# Patient Record
Sex: FEMALE | Race: WHITE
[De-identification: names, ages, dates, MRNs, and addresses within clinical notes are randomized per-mention and may not be internally consistent; named-entity substitution may affect disease eponyms.]

---

## 2017-03-16 ENCOUNTER — HOSPITAL ENCOUNTER (OUTPATIENT)
Dept: HOSPITAL 39 - YCHH | Age: 72
Discharge: HOME | End: 2017-03-16
Payer: MEDICARE

## 2017-03-16 DIAGNOSIS — E11.9: Primary | ICD-10-CM

## 2017-03-16 DIAGNOSIS — I50.9: ICD-10-CM

## 2017-03-16 DIAGNOSIS — I25.10: ICD-10-CM

## 2017-03-16 DIAGNOSIS — N18.9: ICD-10-CM

## 2017-03-16 DIAGNOSIS — I12.9: ICD-10-CM

## 2017-05-08 ENCOUNTER — HOSPITAL ENCOUNTER (OUTPATIENT)
Dept: HOSPITAL 39 - MAMMO | Age: 72
Discharge: HOME | End: 2017-05-08
Attending: NURSE PRACTITIONER
Payer: MEDICARE

## 2017-05-08 DIAGNOSIS — Z12.31: Primary | ICD-10-CM

## 2017-05-08 NOTE — MAM
EXAM DESCRIPTION: 



Screening Mammogram,Bilateral



CLINICAL HISTORY: 



72 years, Female, Screening mammogram



COMPARISON: 



May 2, 2016



TECHNIQUE: 



CC and MLO digital mammograms with computer aided detection.





FINDINGS: 



There are scattered fibroglandular densities.

There is no dominant mass nor any suspicious microcalcifications.

Benign microcalcifications are present.





IMPRESSION: 



BI-RADS 2: BENIGN



FOLLOW-UP: Routine mammography screening.



Electronically signed by:  Luis Martinez MD  5/8/2017 11:41 AM

CDT

## 2017-06-14 ENCOUNTER — HOSPITAL ENCOUNTER (OUTPATIENT)
Dept: HOSPITAL 39 - YCHH | Age: 72
Discharge: HOME | End: 2017-06-14
Payer: MEDICARE

## 2017-06-14 DIAGNOSIS — E11.9: Primary | ICD-10-CM

## 2017-09-14 ENCOUNTER — HOSPITAL ENCOUNTER (OUTPATIENT)
Dept: HOSPITAL 39 - LAB.O | Age: 72
Discharge: HOME | End: 2017-09-14
Attending: NURSE PRACTITIONER
Payer: MEDICARE

## 2017-09-14 DIAGNOSIS — I50.9: Primary | ICD-10-CM

## 2017-09-14 DIAGNOSIS — E11.9: ICD-10-CM

## 2017-09-14 DIAGNOSIS — R35.0: ICD-10-CM

## 2017-09-15 NOTE — RAD
Procedure:  XR CHEST 2 VIEWS



Exam date: 9/14/2017 9:36 AM CDT



Ordering Provider:  Katia Birmingham



Clinical Indication:  CONGESTIVE HEART FAILURE



Comparison: 5/15/2015



Findings:



Mild cardiomegaly. Stable post CABG changes.



Mild central vascular congestion. 



No pleural effusion or pneumothorax. Osseous structures are

nonacute.



No evidence of active tuberculosis.





Impression:



Cardiomegaly with central vascular congestion. No overt failure.

Otherwise, nonacute two-view chest.



Electronically signed by:  Catarino Tobias MD  9/15/2017 5:58 AM

CDT Workstation: [id number]

## 2017-09-18 ENCOUNTER — HOSPITAL ENCOUNTER (OUTPATIENT)
Dept: HOSPITAL 39 - YCFC.O | Age: 72
Discharge: HOME | End: 2017-09-18
Attending: NURSE PRACTITIONER
Payer: MEDICARE

## 2017-09-18 DIAGNOSIS — I50.9: Primary | ICD-10-CM

## 2017-09-20 ENCOUNTER — HOSPITAL ENCOUNTER (EMERGENCY)
Dept: HOSPITAL 39 - ER | Age: 72
Discharge: HOME | End: 2017-09-20
Payer: MEDICARE

## 2017-09-20 VITALS — OXYGEN SATURATION: 99 % | SYSTOLIC BLOOD PRESSURE: 151 MMHG | DIASTOLIC BLOOD PRESSURE: 47 MMHG

## 2017-09-20 VITALS — TEMPERATURE: 98.4 F

## 2017-09-20 DIAGNOSIS — Z79.4: ICD-10-CM

## 2017-09-20 DIAGNOSIS — Z95.1: ICD-10-CM

## 2017-09-20 DIAGNOSIS — I50.43: ICD-10-CM

## 2017-09-20 DIAGNOSIS — E11.9: ICD-10-CM

## 2017-09-20 DIAGNOSIS — I11.0: Primary | ICD-10-CM

## 2017-09-20 PROCEDURE — 80053 COMPREHEN METABOLIC PANEL: CPT

## 2017-09-20 PROCEDURE — 84484 ASSAY OF TROPONIN QUANT: CPT

## 2017-09-20 PROCEDURE — 85025 COMPLETE CBC W/AUTO DIFF WBC: CPT

## 2017-09-20 PROCEDURE — 93005 ELECTROCARDIOGRAM TRACING: CPT

## 2017-09-20 PROCEDURE — 71020: CPT

## 2017-09-20 PROCEDURE — 85379 FIBRIN DEGRADATION QUANT: CPT

## 2017-09-20 PROCEDURE — 83880 ASSAY OF NATRIURETIC PEPTIDE: CPT

## 2017-09-20 PROCEDURE — 84443 ASSAY THYROID STIM HORMONE: CPT

## 2017-09-20 NOTE — RAD
EXAM DESCRIPTION:



XR CHEST 2 VIEWS



CLINICAL HISTORY:



Shortness of breath



COMPARISON:



9/14/2017



TECHNIQUE:



PA/lateral



FINDINGS:



Mild cardiomegaly. Pulmonary vascular congestion. Increased

interstitial markings in the lower lobes suggesting interstitial

edema. The upper lobes are clear. Small amount of pleural fluid

blunting the costophrenic angles



No pneumothorax. No acute bony abnormality



IMPRESSION:



Cardiomegaly and interstitial edema consistent with congestive

heart failure. Minimal effusions







Electronically signed by:  Guero Hoffman MD  9/20/2017 9:21 AM

CDT Workstation: [id number]

## 2017-09-29 ENCOUNTER — HOSPITAL ENCOUNTER (OUTPATIENT)
Dept: HOSPITAL 39 - US | Age: 72
Discharge: HOME | End: 2017-09-29
Attending: INTERNAL MEDICINE
Payer: MEDICARE

## 2017-09-29 DIAGNOSIS — N18.4: Primary | ICD-10-CM

## 2017-09-29 NOTE — US
EXAM DESCRIPTION: 

Renal:-Ultrasound



CLINICAL HISTORY: 

CHRONIC KIDNEY DISEASE



COMPARISON: 

Bilateral renal arterial Doppler evaluation on the same visit.



TECHNIQUE: 

Transcutaneous scanning: Two-dimensional and Doppler modes. 



FINDINGS: 

Right kidney measures 9.9 x 5.2 x 4.8 cm; mid-renal cortical

thickness 9 mm. . Echogenicity increased compared to the liver.

No hydronephrosis No calcifications. Slightly lobulated contour

of the kidney with no perinephric fluid. Normal vascularity. 

Proximal ureter not visualized.



Left kidney measures 9.9 x 4.8 x 5.2 cm; mid-renal cortical

thickness 1.2 mm.. Increased echogenicity. No hydronephrosis. No

calcifications. Lobulated contour of the kidney with no

perinephric fluid. 1.5 x 1.3 cm cyst in the mid kidney. Normal

vascularity..  Proximal ureter not visualized.



Urinary bladder not visualized.



IMPRESSION: Bilateral kidneys showing increased echogenicity

greater than the liver with lobulated contour, consistent with

chronic disease. Bilateral cortical thinning. No hydronephrosis.

Left renal cortical cyst. No Perinephric fluid.



Electronically signed by:  Hema Dolan MD  9/29/2017 11:54 AM

CDT Workstation: [id number]

## 2017-10-02 ENCOUNTER — HOSPITAL ENCOUNTER (OUTPATIENT)
Dept: HOSPITAL 39 - LAB.O | Age: 72
Discharge: HOME | End: 2017-10-02
Attending: INTERNAL MEDICINE
Payer: MEDICARE

## 2017-10-02 DIAGNOSIS — N18.4: Primary | ICD-10-CM

## 2017-10-25 ENCOUNTER — HOSPITAL ENCOUNTER (INPATIENT)
Dept: HOSPITAL 39 - ER | Age: 72
LOS: 2 days | Discharge: HOME | DRG: 292 | End: 2017-10-27
Attending: NURSE PRACTITIONER | Admitting: NURSE PRACTITIONER
Payer: MEDICARE

## 2017-10-25 DIAGNOSIS — E83.42: ICD-10-CM

## 2017-10-25 DIAGNOSIS — E78.5: ICD-10-CM

## 2017-10-25 DIAGNOSIS — E11.22: ICD-10-CM

## 2017-10-25 DIAGNOSIS — Z87.440: ICD-10-CM

## 2017-10-25 DIAGNOSIS — Z79.899: ICD-10-CM

## 2017-10-25 DIAGNOSIS — I13.0: Primary | ICD-10-CM

## 2017-10-25 DIAGNOSIS — I25.10: ICD-10-CM

## 2017-10-25 DIAGNOSIS — Z95.1: ICD-10-CM

## 2017-10-25 DIAGNOSIS — Z79.4: ICD-10-CM

## 2017-10-25 DIAGNOSIS — N18.4: ICD-10-CM

## 2017-10-25 DIAGNOSIS — I50.9: ICD-10-CM

## 2017-10-25 RX ADMIN — INSULIN LISPRO SCH UNITS: 100 INJECTION, SOLUTION INTRAVENOUS; SUBCUTANEOUS at 16:47

## 2017-10-25 RX ADMIN — LEVALBUTEROL SCH MG: 1.25 SOLUTION RESPIRATORY (INHALATION) at 23:48

## 2017-10-25 RX ADMIN — Medication SCH ML: at 20:55

## 2017-10-25 RX ADMIN — INSULIN LISPRO SCH: 100 INJECTION, SOLUTION INTRAVENOUS; SUBCUTANEOUS at 07:54

## 2017-10-25 RX ADMIN — LISINOPRIL SCH MG: 10 TABLET ORAL at 09:03

## 2017-10-25 RX ADMIN — INSULIN LISPRO SCH: 100 INJECTION, SOLUTION INTRAVENOUS; SUBCUTANEOUS at 21:01

## 2017-10-25 RX ADMIN — METOPROLOL TARTRATE SCH MG: 50 TABLET ORAL at 09:40

## 2017-10-25 RX ADMIN — FUROSEMIDE SCH MG: 10 INJECTION, SOLUTION INTRAVENOUS at 09:02

## 2017-10-25 RX ADMIN — METOPROLOL TARTRATE SCH MG: 50 TABLET ORAL at 16:54

## 2017-10-25 RX ADMIN — INSULIN LISPRO SCH UNITS: 100 INJECTION, SOLUTION INTRAVENOUS; SUBCUTANEOUS at 13:31

## 2017-10-25 RX ADMIN — CLOPIDOGREL BISULFATE SCH MG: 75 TABLET ORAL at 09:03

## 2017-10-25 RX ADMIN — LEVALBUTEROL SCH MG: 1.25 SOLUTION RESPIRATORY (INHALATION) at 17:00

## 2017-10-25 RX ADMIN — Medication SCH: at 03:32

## 2017-10-25 RX ADMIN — LEVALBUTEROL SCH MG: 1.25 SOLUTION RESPIRATORY (INHALATION) at 08:20

## 2017-10-25 RX ADMIN — ENOXAPARIN SODIUM SCH MG: 40 INJECTION, SOLUTION INTRAVENOUS; SUBCUTANEOUS at 09:04

## 2017-10-25 RX ADMIN — PANTOPRAZOLE SODIUM SCH MG: 40 INJECTION, POWDER, FOR SOLUTION INTRAVENOUS at 13:32

## 2017-10-25 RX ADMIN — LISINOPRIL SCH MG: 10 TABLET ORAL at 20:53

## 2017-10-25 RX ADMIN — Medication SCH ML: at 09:02

## 2017-10-25 RX ADMIN — FUROSEMIDE SCH MG: 10 INJECTION, SOLUTION INTRAVENOUS at 16:54

## 2017-10-25 RX ADMIN — INSULIN DETEMIR SCH UNITS: 100 INJECTION, SOLUTION SUBCUTANEOUS at 08:59

## 2017-10-25 NOTE — RAD
Procedure:  XR CHEST 1 VIEW        



Exam Date:  10/25/2017



Ordering Provider:  Meghann Slater



Clinical Indication:  SOB/ Hx of CHF



Comparison: 9/20/2017



Findings: 

Residuals of thoracic surgery.

Cardiac silhouette: Magnified by technique

Pulmonary vasculature : Prominence of the central pulmonary

vasculature and bibasilar interstitium.

Mediastinal contour: Normal 

Aortic contour: Aortic calcification 

Focal lung consolidation: No focal lung consolidation.

Pleural effusion: No large pleural effusions.

Pneumothorax: None

Acute bony or soft tissue abnormality: None



Impression: 

1. CHF and/or volume overload.



Electronically signed by:  Adonay Araya MD  10/25/2017 1:43 AM CDT

Workstation: [id number]

## 2017-10-25 NOTE — HP
SUPERVISING PHYSICIAN:  Guero Rios M.D.



CHIEF COMPLAINT:  I couldn't breathe.



HISTORY OF PRESENT ILLNESS:  This is a 72 year-old female patient who was 
brought to the Emergency Room by her .  She has a significant history of 
congestive heart failure.  She found that she had started getting bloated 
several days ago and her weight had gone up.  She sees Dr. Jeff, cardiologist
, but she has been out of her Lasix for 5 to 6 days.  She reports that she was 
in the hospital in [place] at some point in the last few weeks for this 
same thing.  In the Emergency Room, she was given some Lasix and her labs were 
done.  She had a white count of 11.8 and hemoglobin 11.5, hematocrit 34.7.  D-
dimer was 387.  Sodium 138, potassium 4.6, chloride 110, carbon dioxide 25, BUN 
40, creatinine 2.12.  Magnesium 1.7, AST 66, ALT 73, alkaline phosphatase 106, 
BNP 1,620.  Urinalysis was basically within normal limits.  Chest x-ray per 
radiology interpretation showed congestive heart failure and/or volume 
overload.  She was admitted to the hospital early this morning.



PAST MEDICAL HISTORY: 

1.   Type 2 diabetes mellitus.

2.   Hyperlipidemia.

3.   Hypertension.

4.   Coronary artery disease.

5.   Congestive heart failure of unknown etiology.

6.   Chronic kidney disease stage IV.

7.   History of recent urinary tract infection.



PAST SURGICAL HISTORY:

1.   Hysterectomy.

2.   Coronary artery bypass graft.

3.   Appendectomy.



OUTPATIENT MEDICATIONS:  Per the EMR and awaiting verification.



ALLERGIES:  NO KNOWN DRUG ALLERGIES.



CODE STATUS:  FULL CODE.



FAMILY HISTORY:  Mother  of a myocardial infarction.  Father  of a 
myocardial infarction.



SOCIAL HISTORY:  She is retired.  She is .  She has 4 children.  She has 
never smoked tobacco.  She drinks alcohol on a social basis only.  She drinks 
caffeine daily.



REVIEW OF SYSTEMS: 

GENERAL:  Denies fever or fatigue.  Positive for weight gain of unknown amount.

HEENT:  Negative for sinus symptoms, ear pain, vision changes or sore throat.

RESPIRATORY:  Negative for coughing or wheezing.  Positive for shortness of 
breath.

CARDIAC:  Negative for chest pain, palpitations or tachycardia.

GASTROINTESTINAL:  Negative for nausea, vomiting, diarrhea or constipation.  
Positive for generalized bloating and abdominal distention.

EXTREMITIES:  Denies edema.

SKIN:  Denies lesions or rashes.

NEUROLOGIC:  Denies headache, dizziness or seizures.



PHYSICAL EXAMINATION: 



VITAL SIGNS:  She is afebrile, heart rate 61, blood pressure 90/50, respiratory 
rate 16, O2 sat is 96% on room air.



GENERAL:  This is a 72 year-old female who is lying in her hospital bed.



HEENT:  Normocephalic and atraumatic.  Pupils are equal and reactive.  
Oropharynx is clear.



NECK:  Supple without mass.  It is difficult to discern if there is any jugular 
venous distention at this time.



RESPIRATORY:  Scattered crackles throughout, somewhat diminished at the bases.



CARDIOVASCULAR:  Regular rate and rhythm.



ABDOMEN:  Soft.  It is rounded.  It is non-tender.  Bowel sounds are positive.



EXTREMITIES:  No cyanosis or clubbing.  There is a trace of pedal edema 
bilaterally.



NEUROLOGIC:  She is awake, alert and oriented times three.



SKIN:  Warm and dry.



LABORATORY:  Labs and films are as per the history of present illness.  All 
other labs and films have been reviewed via the EMR.



ASSESSMENT: 

1.   Acute exacerbation of congestive heart failure of unknown etiology.  She is

      presently on a beta blocker as well as an Ace inhibitor.  She is also 

      prescribed a diuretic.

2.   Diabetes mellitus type 2.

3.   Hypertension.

4.   Hyperlipidemia.

5.   Chronic kidney disease stage IV.

6.   Coronary artery disease.



PLAN:  The patient has been admitted to the hospital.  She has received IV 
Lasix in the Emergency Room and we have also given her IV Lasix twice a day 
here on the floor.  We are monitoring her electrolytes closely.  We will also 
give her some magnesium.  We will try to get an echo report on her tomorrow to 
find out her exact etiology of her congestive heart failure.  I started her on 
Accu-Cheks with sliding scale insulin.  She has been placed on a cardiac 
monitor.  Hopefully she can be discharged in the next 2 to 3 days.  Will 
continue to monitor her closely and followup as needed.  Dr. Rios is the 
collaborating physician available for consultation.



#[id number]
[facility]

## 2017-10-25 NOTE — ED.PDOC
History of Present Illness





- General


Chief Complaint: Respiratory Problem


Stated Complaint: shortness of breath


Time Seen by Provider: 10/25/17 01:09


Source: patient, RN notes reviewed, Vital Signs reviewed


Exam Limitations: no limitations





- History of Present Illness


Initial Comments: 





Patient come to the ER with c/o SOB that started earlier tonight. She thinks it 

is her CHF. She has been out of her Lasix for the past 4 days and has noticed a 

10# weight gain. No chest pain. Fells same as episode she had ~ 1 month ago 

when she was seen here. She reports after that she was in the hospital @ 

[facility] for 3 days.


Timing/Duration: 7-24 hours


Severity: moderate


Activities at Onset: sleep


Possible Cause: occasional episodes


Improving Factors: nothing


Worsening Factors: movement - & talking


Associated Symptoms: denies symptoms


Respiratory Risk Factors: other - Hx of CHF, not taking her Lasix


Allergies/Adverse Reactions: 


Allergies





NO KNOWN ALLERGY Allergy (Verified 17 08:38)


 








Home Medications: 


Ambulatory Orders





Furosemide Tab [Lasix Tab] 20 mg PO DAILY #30 tab 05/15/15 


Clopidogrel Bisulfate [Plavix] 75 mg PO DAILY 17 


Insulin Glargine [Lantus Solostar] 40 unit SC DAILY 17 


Lisinopril 20 mg PO BID 17 


Metoprolol Tartrate 100 mg PO BID 17 


Pravastatin Sodium 40 mg PO BEDTIME 17 











Review of Systems





- Review of Systems


Constitutional: States: no symptoms reported.  Denies: chills, fever, malaise


Respiratory: States: see HPI, short of breath


Cardiology: States: see HPI, edema.  Denies: chest pain, palpitations


Gastrointestinal/Abdominal: States: no symptoms reported


Musculoskeletal: States: no symptoms reported


Skin: States: no symptoms reported


Neurological: States: no symptoms reported


All other Systems: No Change from Baseline





Past Medical History (General)





- Patient Medical History


Hx Stroke: No


Hx Cardiac Disorders: Yes


Hx Congestive Heart Failure: Yes


Hx Hypertension: Yes


Hx Diabetes: Yes


Surgical History: appendectomy, coronary bypass surgery, Hysterectomy





- Vaccination History


Hx Tetanus, Diphtheria Vaccination: Yes


Hx Influenza Vaccination: Yes


Hx Pneumococcal Vaccination: Yes





- Social History


Hx Tobacco Use: No


Hx Alcohol Use: No


Hx Physical Abuse: No


Hx Emotional Abuse: No


Hx Suspected Abuse: No





- Female History


Patient Pregnant: No





Family Medical History





- Family History


  ** Mother


Family History: No Known


Living Status: 


Hx Cardiac Disease: Yes - multiple family members


Hx Family Diabetes: Yes - multiple family members





Physical Exam





- Physical Exam


General Appearance: Alert, Obvious distress - obviously SOB on 2-4 word 

sentences, Well Developed, Well Groomed, Well Hydrated, Well Nourished


Neck: supple, normal inspection


Respiratory: chest non-tender, decreased breath sounds, accessory muscle use, 

crackles


Cardiovascular/Chest: normal peripheral pulses, regular rate, rhythm, no gallop

, no murmur


Peripheral Pulses: dorsalis pedis,right: 2+, dorsalis pedis,left: 2+


Gastrointestinal/Abdominal: normal bowel sounds, soft, no organomegaly, no 

pulsatile mass, tenderness - mold diffuse tenderness


Extremity: non-tender, pedal edema


Neurologic: alert, normal mood/affect, oriented x 3


Skin Exam: normal color, warm/dry


Comments: 





 Vital Signs











  10/25/17 10/25/17





  01:07 01:10


 


Temperature 99.8 F H 


 


Pulse Rate [ 78 





Apical]  


 


Respiratory 28 H 28 H





Rate  


 


Blood Pressure 160/94 





[Left Arm]  


 


O2 Sat by Pulse 97 





Oximetry  














Progress





- Progress


Progress: 





10/25/17 02:07


Lasix 40mg IV given


10/25/17 02:10


Discussed with Dr. Alvarez. Will admit to hospital.





- Results/Orders


Results/Orders: 





 Laboratory Tests











  10/25/17 10/25/17 10/25/17





  01:30 01:30 01:30


 


WBC   11.8 H 


 


RBC   3.87 L 


 


Hgb   11.5 L 


 


Hct   34.7 L 


 


MCV   89.5 


 


MCH   29.7 


 


MCHC   33.3 


 


RDW   14.5 


 


Plt Count   175 


 


MPV   9.8 


 


Absolute Neuts (auto)   9.00 H 


 


Absolute Lymphs (auto)   1.70 


 


Absolute Monos (auto)   0.80 


 


Absolute Eos (auto)   0.20 


 


Absolute Basos (auto)   0.10 


 


Neutrophils %   76.1 


 


Lymphocytes %   14.2 L 


 


Monocytes %   7.0 


 


Eosinophils %   2.0 


 


Basophils %   0.7 


 


D-Dimer, Quantitative    387 H*


 


Sodium  138  


 


Potassium  4.6  


 


Chloride  110  


 


Carbon Dioxide  25  


 


Anion Gap  7.6 L  


 


BUN  40 H  


 


Creatinine  2.12 H  


 


BUN/Creatinine Ratio  18.9  


 


Random Glucose  73  


 


Serum Osmolality  284.0  


 


Calcium  9.0  


 


Total Bilirubin  0.6  


 


AST  66 H  


 


ALT  73 H  


 


Alkaline Phosphatase  106  


 


Creatine Kinase  41  


 


CK-MB (CK-2)  0.9  


 


CK-MB (CK-2) %  Not Reportable  


 


Troponin I  0.02  


 


B-Natriuretic Peptide  1620.0 H*  


 


Serum Total Protein  7.0  


 


Albumin  3.7  


 


Globulin  3.3  


 


Albumin/Globulin Ratio  1.1  














- EKG/XRAY/CT


EKG: Sinus, nonspecific ST T wave Chg, Unchanged from - 17


XRAY: chest - CHF &/or volume overload per Radiologist





Departure





- Departure


Clinical Impression: 


Congestive heart failure


Qualifiers:


 Congestive heart failure type: combined Congestive heart failure chronicity: 

acute on chronic Qualified Code(s): I50.43 - Acute on chronic combined systolic 

(congestive) and diastolic (congestive) heart failure





Time of Disposition: 02:11


Disposition: Admit Patient


Condition: Poor


Departure Forms:  ED Discharge - Pt. Copy, Patient Portal Self Enrollment


Referrals: 


Katia Birmingham NP [Primary Care Provider] - 1-2 Weeks


Home Medications: 


Ambulatory Orders





Furosemide Tab [Lasix Tab] 20 mg PO DAILY #30 tab 05/15/15 


Clopidogrel Bisulfate [Plavix] 75 mg PO DAILY 17 


Insulin Glargine [Lantus Solostar] 40 unit SC DAILY 17 


Lisinopril 20 mg PO BID 17 


Metoprolol Tartrate 100 mg PO BID 17 


Pravastatin Sodium 40 mg PO BEDTIME 17 











Decision To Admit





- Decistion To Admit


Decision to Admit Reason: Admit from ER


Decision to Admit Date: 10/25/17


Decision to Admit Time: 02:09

## 2017-10-25 NOTE — PCM.CORE
Physician DVT/VTE





- 5 or more Very High Risk


Treatments: Sequential Compression Device


Pharmacological: Enoxaparin 40mg SQ Daily

## 2017-10-26 RX ADMIN — FUROSEMIDE SCH MG: 10 INJECTION, SOLUTION INTRAVENOUS at 17:12

## 2017-10-26 RX ADMIN — LEVALBUTEROL SCH MG: 1.25 SOLUTION RESPIRATORY (INHALATION) at 16:45

## 2017-10-26 RX ADMIN — FUROSEMIDE SCH MG: 10 INJECTION, SOLUTION INTRAVENOUS at 08:31

## 2017-10-26 RX ADMIN — LEVALBUTEROL SCH MG: 1.25 SOLUTION RESPIRATORY (INHALATION) at 09:08

## 2017-10-26 RX ADMIN — INSULIN LISPRO SCH UNITS: 100 INJECTION, SOLUTION INTRAVENOUS; SUBCUTANEOUS at 17:11

## 2017-10-26 RX ADMIN — PANTOPRAZOLE SODIUM SCH MG: 40 INJECTION, POWDER, FOR SOLUTION INTRAVENOUS at 06:04

## 2017-10-26 RX ADMIN — METOPROLOL TARTRATE SCH MG: 50 TABLET ORAL at 08:30

## 2017-10-26 RX ADMIN — LEVALBUTEROL SCH MG: 1.25 SOLUTION RESPIRATORY (INHALATION) at 23:45

## 2017-10-26 RX ADMIN — INSULIN LISPRO SCH: 100 INJECTION, SOLUTION INTRAVENOUS; SUBCUTANEOUS at 08:29

## 2017-10-26 RX ADMIN — INSULIN LISPRO SCH UNITS: 100 INJECTION, SOLUTION INTRAVENOUS; SUBCUTANEOUS at 12:12

## 2017-10-26 RX ADMIN — Medication SCH ML: at 20:04

## 2017-10-26 RX ADMIN — Medication SCH ML: at 08:40

## 2017-10-26 RX ADMIN — INSULIN LISPRO SCH UNITS: 100 INJECTION, SOLUTION INTRAVENOUS; SUBCUTANEOUS at 21:14

## 2017-10-26 RX ADMIN — LISINOPRIL SCH MG: 10 TABLET ORAL at 20:05

## 2017-10-26 RX ADMIN — INSULIN DETEMIR SCH UNITS: 100 INJECTION, SOLUTION SUBCUTANEOUS at 08:32

## 2017-10-26 RX ADMIN — ENOXAPARIN SODIUM SCH MG: 40 INJECTION, SOLUTION INTRAVENOUS; SUBCUTANEOUS at 08:31

## 2017-10-26 RX ADMIN — CLOPIDOGREL BISULFATE SCH MG: 75 TABLET ORAL at 08:30

## 2017-10-26 RX ADMIN — LISINOPRIL SCH MG: 10 TABLET ORAL at 08:30

## 2017-10-26 RX ADMIN — METOPROLOL TARTRATE SCH MG: 50 TABLET ORAL at 17:12

## 2017-10-26 NOTE — PN
DATE:  10/26/17



SUPERVISING PHYSICIAN:  Guero Rios M.D.



SUBJECTIVE:  The patient is sitting up in her hospital bed.  She reports that 
she is feeling much better.  She has much less shortness of breath, although 
she does get short of breath with some exertion.  Otherwise no complaints of 
chest pain, nausea, vomiting or diarrhea.



OBJECTIVE:  VITAL SIGNS: Temperature 98.1, pulse rate 68, blood pressure 128/81
, respiratory rate 18, O2 sat is 94% on room air.  RESPIRATORY: Essentially 
clear to auscultation bilaterally.  She is somewhat diminished at the bases.  
CARDIAC: Regular rate and rhythm.  ABDOMEN: Soft, rounded, nondistended.  Bowel 
sounds are positive.  EXTREMITIES: No cyanosis, clubbing or edema.  NEUROLOGIC: 
She is awake, alert and oriented times three.



LABORATORY:  WBCs have improved to 7.1 with hemoglobin 11.9, hematocrit 35.7.  
Sodium 136, potassium 4.3, chloride 102, carbon dioxide 25, BUN 48, creatinine 
2.37.  Blood sugars have run between 86 and 285.  Hemoglobin A1c is 7.8.  
Cardiac enzymes are negative.  Liver enzymes are also within normal limits.



RADIOLOGY:  Chest x-ray shows no acute cardiopulmonary processes.  All other 
labs and films have been reviewed via the EMR.



ASSESSMENT: 

1.   Acute exacerbation of congestive heart failure of unknown etiology.  She 
is 

      presently on a beta blocker as well as an Ace inhibitor.  She is also on 
a 

      prescribed diuretic.

2.   Diabetes mellitus type 2.

3.   Hypertension.

4.   Hyperlipidemia.

5.   Chronic kidney disease stage IV.

6.   Coronary artery disease.

7.   Mild hypomagnesemia.



PLAN:  We will continue present supportive care.  I have switched her IV Lasix 
to p.o. Lasix for tomorrow.  I will also recheck her electrolytes as well as 
her magnesium in the morning.  Her creatinine is slightly worsened today.  Her 
baseline creatinine is approximately 2.1 to 2.2, so she is not far from baseline
, but it is slightly worsened since yesterday.  If that does not improve 
overnight, we may need to call her nephrologist, Dr. Gay in [place] for 
any recommendations.  I will also order physical therapy tomorrow as well as an 
ambulation study.  Hopefully she can be discharged home with close followup.  I 
have given her extensive congestive heart failure education to reinforce her 
need to not run out of her medications, to take them as prescribed.  She will 
need close followup with Katia Birmingham.  We will continue to monitor her closely 
and followup as needed.  Dr. Rios is the collaborating physician and 
available for consultation.



#[id number]
[facility]

## 2017-10-26 NOTE — RAD
Clinical History : CHF , MAIN



Exam : PA and lateral views of the chest 10/26/2017 7:00 AM CDT



Comparisons : 

Portable AP view of the chest October 25, 2017



Findings : 







The lungs are clear without focal consolidation or pleural

effusion.



The heart is normal in size.  The mediastinal contours are normal

in appearance.  

The patient is status post sternotomy and CABG. 

There are vascular calcifications along the aortic arch.



The thoracic spine is age appropriate.  The shoulders are

unremarkable.



Limited evaluation of the upper abdomen demonstrates no gross

abnormalities.



Impression:



No acute cardiopulmonary disease

 



Electronically signed by:  Toni Zambrano MD  10/26/2017 6:56 AM

CDT Workstation: [id number]

## 2017-10-27 VITALS — TEMPERATURE: 98.6 F | DIASTOLIC BLOOD PRESSURE: 66 MMHG | OXYGEN SATURATION: 100 % | SYSTOLIC BLOOD PRESSURE: 107 MMHG

## 2017-10-27 RX ADMIN — CLOPIDOGREL BISULFATE SCH MG: 75 TABLET ORAL at 08:18

## 2017-10-27 RX ADMIN — Medication SCH ML: at 08:18

## 2017-10-27 RX ADMIN — LISINOPRIL SCH MG: 10 TABLET ORAL at 08:19

## 2017-10-27 RX ADMIN — INSULIN DETEMIR SCH UNITS: 100 INJECTION, SOLUTION SUBCUTANEOUS at 08:19

## 2017-10-27 RX ADMIN — LEVALBUTEROL SCH MG: 1.25 SOLUTION RESPIRATORY (INHALATION) at 08:01

## 2017-10-27 RX ADMIN — METOPROLOL TARTRATE SCH MG: 50 TABLET ORAL at 07:38

## 2017-10-27 RX ADMIN — INSULIN LISPRO SCH UNITS: 100 INJECTION, SOLUTION INTRAVENOUS; SUBCUTANEOUS at 07:36

## 2017-10-27 NOTE — RAD
Procedure:  XR CHEST 2 VIEWS        



Exam Date:  10/27/2017



Ordering Provider:  LEX MENDOZA



Clinical Indication:  chf



Comparison: 10/26/2017



Findings: 

Residuals of thoracic surgery.

Cardiomediastinal silhouette is stable. 

Focal lung consolidation: No focal lung consolidation. No

evidence of pulmonary edema.

Pleural effusion: None

Pneumothorax: None

Acute bony or soft tissue abnormality: None



Impression: 

1. No acute abnormalities in the chest.



Electronically signed by:  Adonay Araya MD  10/27/2017 6:29 AM CDT

Workstation: [id number]

## 2017-11-03 NOTE — DS
SUPERVISING PHYSICIAN:  Guero Rios MD



DISCHARGE DIAGNOSIS: 

1.   Acute exacerbation of congestive heart failure of unknown etiology with no

      echocardiogram available for review with the patient having been on a

      beta blocker as well as an ACE inhibitor and started on a prescribed 
diuretic,

      secondary to failure to comply with medical treatment plan.  

2.   Diabetes mellitus, type 2.

3.   Hypertension.

4.   Hyperlipidemia.

5.   Chronic kidney disease, stage IV.

6.   Coronary artery disease.

7.   Mild hypomagnesemia, resolved and normalized prior to discharge with

      transfusion of magnesium.  



HISTORY OF PRESENT ILLNESS:  Ms. Jaramillo is a 72-year-old  female 
patient who was brought to the Emergency Room by her .  She has a 
significant history of congestive heart failure.  She had started getting 
bloated several days prior and had a weight gain.  She sees Dr. Jeff, 
cardiologist, but she had been out of her Lasix for 5 to 6 days.  She reports 
that she was in the hospital in [place] at some point in the last few 
weeks for this same thing.  In the Emergency Room, she was given some Lasix and 
her labs were done.  She had a white count of 11.8 and hemoglobin 11.5, 
hematocrit 34.7.  D-dimer was 387.  Magnesium 1.7, BNP 1,620.  Chest x-ray per 
radiologic interpretation showed congestive heart failure with volume overload.
  She was admitted to the hospital for admission of treatment.



LABORATORY:  CBC on admission showed white count 11,800.  Prior to discharge, 
it was down to 7,000.  Hemoglobin and hematocrit were stable and at discharge 
were 11.8 and 35.3 with platelet count 160,000.  Differential did initially 
show a left shift, but this resolved prior to discharge.  Coagulation studies 
showed an elevated D-dimer at 387.  Chemistries on admission showed normal 
electrolytes with BUN 40, creatinine 2.12.  After aggressive diuresis with Lasix
, her BUN did go up to 65, creatinine 2.76 with serum osmolality that went from 
284 to 301.  Electrolytes remained within normal limits.  Blood sugars were 
fairly well controlled between 86 and 229.  Liver functions were all within 
normal limits.  Cardiac enzymes were within normal limits except for an 
elevated BNP of 1620.  She did initial magnesium of 1.7.  After replacement, 
magnesium went up to 2.0.  



MICROBIOLOGY:  No specimens were submitted.



RADIOLOGY:  Initial x-ray in the Emergency Department prior to admission showed 
congestive heart failure and volume overload.  Followup chest x-ray on the 
morning of discharge on 10/27/17 showed no acute abnormalities within the 
chest.  She had an EKG in the Emergency Department that showed a normal sinus 
rhythm with a rate of 80.  No ST or T wave changes were noted.  



HOSPITAL COURSE:  Ms. Jaramillo was admitted as noted above and initiated on 
diuretic therapy for congestive heart failure exacerbation with IV Lasix.  She 
did show a good response to treatment with approximately 4 liters of fluid 
removed with diuresis prior to discharge.  Initial weight was 82.1 kg.  Prior 
to discharge, it had gone down to 80.5 kg.  X-ray did show resolution of the 
pulmonary edema.  Symptomatically, she was improved.  Vital signs on discharge 
showed blood pressure 107/66, saturation 100% on room air, heart rate 81, 
afebrile.  She was felt clinically well enough to be discharged to continue to 
outpatient treatment plan.  



PLAN:  The patient was discharged on 10/27/17 with instructions to have close 
clinical followup with Dr. Jeff and Katia Birmingham as scheduled.  She is to 
resume her home medications as directed.  She was to weigh daily and if she 
gained 3 pounds or more, she was to call her primary care provider. She was to 
return to the should she have any concerning symptoms.  She has an appointment 
with Katia Birmingham on 10/31/17.  



DISCHARGE PRESCRIPTIONS:

1.  Refill of her Lasix 40 mg daily.



All other medications to resume as prior to admission.



DIET AT DISCHARGE:  Diabetic diet with close monitoring of total daily fluids.



ACTIVITY:  Increase as tolerated.  



CONDITION AT DISCHARGE:  Stable and improved.



#[id number]
[facility]

## 2017-12-13 ENCOUNTER — HOSPITAL ENCOUNTER (OUTPATIENT)
Dept: HOSPITAL 39 - LAB.O | Age: 72
Discharge: HOME | End: 2017-12-13
Attending: INTERNAL MEDICINE
Payer: MEDICARE

## 2017-12-13 DIAGNOSIS — N18.4: Primary | ICD-10-CM

## 2018-03-28 ENCOUNTER — HOSPITAL ENCOUNTER (OUTPATIENT)
Dept: HOSPITAL 39 - LAB.O | Age: 73
End: 2018-03-28
Attending: INTERNAL MEDICINE
Payer: MEDICARE

## 2018-03-28 DIAGNOSIS — N18.4: Primary | ICD-10-CM

## 2018-08-08 ENCOUNTER — HOSPITAL ENCOUNTER (OUTPATIENT)
Dept: HOSPITAL 39 - LAB.O | Age: 73
End: 2018-08-08
Attending: INTERNAL MEDICINE
Payer: MEDICARE

## 2018-08-08 DIAGNOSIS — N18.4: Primary | ICD-10-CM

## 2018-09-22 ENCOUNTER — HOSPITAL ENCOUNTER (OUTPATIENT)
Dept: HOSPITAL 39 - LAB.O | Age: 73
End: 2018-09-22
Attending: INTERNAL MEDICINE
Payer: MEDICARE

## 2018-09-22 DIAGNOSIS — N18.4: Primary | ICD-10-CM

## 2018-12-26 ENCOUNTER — HOSPITAL ENCOUNTER (EMERGENCY)
Dept: HOSPITAL 39 - ER | Age: 73
LOS: 1 days | Discharge: HOME | End: 2018-12-27
Payer: MEDICARE

## 2018-12-26 DIAGNOSIS — F41.0: Primary | ICD-10-CM

## 2018-12-26 DIAGNOSIS — I11.0: ICD-10-CM

## 2018-12-26 DIAGNOSIS — I50.9: ICD-10-CM

## 2018-12-26 DIAGNOSIS — Z79.899: ICD-10-CM

## 2018-12-26 DIAGNOSIS — R06.02: ICD-10-CM

## 2018-12-26 DIAGNOSIS — Z79.4: ICD-10-CM

## 2018-12-26 DIAGNOSIS — E11.9: ICD-10-CM

## 2018-12-27 VITALS — TEMPERATURE: 97.7 F

## 2018-12-27 VITALS — SYSTOLIC BLOOD PRESSURE: 122 MMHG | DIASTOLIC BLOOD PRESSURE: 76 MMHG | OXYGEN SATURATION: 97 %

## 2018-12-27 NOTE — ED.PDOC
History of Present Illness





- General


Chief Complaint: Behavioral / Psych


Stated Complaint: feels SOB, and weak after electricity went out


Time Seen by Provider: 18 23:45


Source: patient


Exam Limitations: no limitations





- History of Present Illness


Initial Comments: 





the patient is 73-year-old  female presenting to the emergency room 

after having had an episode of shortness of breath after her electricity went 

out.  The patient does have a history of anxiety attacks and she believes this 

is one as well.  She does however have a history of congestive heart failure but

she was not having any shortness of breath until the power went out.  No chest 

pain.  No syncope.  No cough fever or runny nose.  Vital signs are reassuring.


Timing/Duration: 1/2 hour


Severity: moderate


Improving Factors: nothing


Worsening Factors: nothing


Associated Symptoms: shortness of breath


Allergies/Adverse Reactions: 


Allergies





NO KNOWN ALLERGY Allergy (Verified 18 00:04)


   








Home Medications: 


Ambulatory Orders





Clopidogrel Bisulfate [Plavix] 75 mg PO BEDTIME 17 


Insulin Glargine [Lantus Solostar] 40 unit SC DAILY 17 


Lisinopril 20 mg PO DAILY 17 


Metoprolol Tartrate 50 mg PO BID 17 


Pravastatin Sodium 40 mg PO BEDTIME 17 


Furosemide Tab [Lasix Tab] 40 mg PO DAILY  tab 10/27/17 











Review of Systems





- Review of Systems


Constitutional: States: no symptoms reported


EENTM: States: no symptoms reported


Respiratory: States: short of breath


Cardiology: States: no symptoms reported


Gastrointestinal/Abdominal: States: no symptoms reported


Genitourinary: States: no symptoms reported


Musculoskeletal: States: no symptoms reported


Skin: States: no symptoms reported


Neurological: States: anxiety


Endocrine: States: no symptoms reported


All other Systems: No Change from Baseline





Past Medical History (General)





- Patient Medical History


Hx Seizures: No


Hx Stroke: No


Hx Asthma: No


Hx of COPD: No


Hx Cardiac Disorders: Yes


Hx Congestive Heart Failure: Yes


Hx Pacemaker: No


Hx Hypertension: Yes


Hx Thyroid Disease: No


Hx Diabetes: Yes


Hx Gastroesophageal Reflux: No


Hx Renal Disease: Yes


Hx Cancer: No


Hx of HIV: No


Hx Hepatitis C: No


Hx MRSA: No


Surgical History: appendectomy, Hysterectomy





- Vaccination History


Hx Tetanus, Diphtheria Vaccination: Yes


Hx Influenza Vaccination: Yes


Hx Pneumococcal Vaccination: Yes





- Social History


Hx Tobacco Use: No


Hx Alcohol Use: No


Hx Substance Use: No


Hx Physical Abuse: No


Hx Emotional Abuse: No


Hx Suspected Abuse: No





- Female History


Patient Pregnant: No





Family Medical History





- Family History


  ** Mother


Family History: No Known


Living Status: 


Hx Cardiac Disease: Yes - multiple family members


Hx Family Diabetes: Yes - multiple family members





Physical Exam





- Physical Exam


General Appearance: Alert, Anxious, No apparent distress


Eye Exam: bilateral normal


Ears, Nose, Throat: normal ENT inspection, normal pharynx


Neck: full range of motion, supple, normal inspection


Respiratory: lungs clear, normal breath sounds, no respiratory distress, no 

accessory muscle use


Cardiovascular/Chest: normal peripheral pulses, regular rate, rhythm, no edema


Peripheral Pulses: radial,right: 2+, radial,left: 2+


Gastrointestinal/Abdominal: non tender - obese, soft


Rectal Exam: deferred


Back Exam: normal inspection


Extremity: normal range of motion, non-tender, no pedal edema, normal capillary 

refill


Neurologic: CNs II-XII nml as tested, alert, normal mood/affect - except very 

anxious, oriented x 3


Skin Exam: normal color


Comments: 





                               Vital Signs - 24 hr











  18





  23:40 00:22


 


Temperature 97.7 F 


 


Pulse Rate [ 70 68





monitor]  


 


Respiratory 16 16





Rate  


 


Blood Pressure 172/72 122/76





[Left Arm]  


 


O2 Sat by Pulse 99 97





Oximetry  














Progress





- Progress


Progress: 





18 01:01


the patient is a 73-year-old  female presenting to the emergency room 

secondary to acute onset shortness of breath after her electricity went out.  T

his appears to be most likely due to an anxiety attack.  She has responded well 

to a dose of an anxiolytic.  Chest x-ray is reassuring.  She doesneed to  keep 

follow-up with her primary care doctor next week.  ER warnings were given.  

Telemetry monitoring is also reassuring.





Departure





- Departure


Clinical Impression: 


 Panic attack





Disposition: Discharge to Home or Self Care


Condition: Fair


Departure Forms:  ED Discharge - Pt. Copy, Patient Portal Self Enrollment


Instructions:  Anxiety, Adult (DC), Panic Disorder


Diet: diabetic diet


Activity: increase activity as tolerated


Referrals: 


Katia Birmingham NP [Primary Care Provider] - 1-2 Weeks


Home Medications: 


Ambulatory Orders





Clopidogrel Bisulfate [Plavix] 75 mg PO BEDTIME 17 


Insulin Glargine [Lantus Solostar] 40 unit SC DAILY 17 


Lisinopril 20 mg PO DAILY 17 


Metoprolol Tartrate 50 mg PO BID 17 


Pravastatin Sodium 40 mg PO BEDTIME 17 


Furosemide Tab [Lasix Tab] 40 mg PO DAILY  tab 10/27/17 








Additional Instructions: 


the patient is a 73-year-old  female presenting to the emergency room 

secondary to acute onset shortness of breath after her electricity went out.  

This appears to be most likely due to an anxiety attack.  She has responded well

to a dose of an anxiolytic.  Chest x-ray is reassuring.  She doesneed to  keep 

follow-up with her primary care doctor next week.  ER warnings were given.  

Telemetry monitoring is also reassuring.

## 2018-12-27 NOTE — RAD
CHEST  12/27/2018 at 0000 hours.  



CLINICAL HISTORY:  Shortness of breath.



COMPARISON: 10/27/2017



TECHNIQUE: Frontal and lateral Chest.



FINDINGS: 



Normal cardiac size. Pulmonary vascularity appears normal. Mild

aortic atherosclerosis. Sternal wires and mediastinal clips are

noted.



Faint opacity adjacent to the right cardiac border is stable

suggestive of a prominent pericardial fat pad. Lungs are clear.

Lungs are hyperinflated.  Pleural spaces are clear.



Moderate degenerative thoracic spine changes are seen.

Unremarkable soft tissues.



IMPRESSION:



1. No acute chest disease.

2. Hyperinflation.



Electronically signed by:  Michelle Morales DO  12/27/2018 12:26 AM

[facility] Workstation: [id number]

## 2020-02-11 ENCOUNTER — HOSPITAL ENCOUNTER (OUTPATIENT)
Dept: HOSPITAL 39 - MAMMO | Age: 75
End: 2020-02-11
Attending: NURSE PRACTITIONER
Payer: MEDICARE

## 2020-02-11 DIAGNOSIS — Z12.31: Primary | ICD-10-CM

## 2020-02-14 NOTE — MAM
EXAM DESCRIPTION: 

3D Screening BILATERAL : Digital Mammography.



CLINICAL HISTORY: 

74 years Female ANNUAL SCREENING . No complaints. No personal or

family history of breast cancer. Menarche age 9. Childbirth age

16. Menopause age unknown. No HRT. Lifetime risk of developing

breast cancer (Tyrer-Cuzick model)(%):  3.2.



COMPARISON: 

2-D digital screening bilateral mammography May 2017.   



TECHNIQUE: 

Bilateral CC and MLO projection full-field images, digital

tomosynthesis mammographic technique. Bilateral digital 2-D

full-field MLO images.  CAD available for 2-D images.  



FINDINGS: 

The breast parenchymal density pattern is: Scattered areas of

fibroglandular density.   No skin thickening or nipple

retraction.  Vascular calcifications. Solitary parenchymal

calcifications. Secretory/ductal type calcifications medial and

lateral anterior left breast. Skin mole marker posterior right

breast. Minimally enlarging mass density anterior lateral right

breast, possibly a lymph node. 8:30 middle third right breast 6

cm from the nipple.

No new focal, stellate mass or density, focal asymmetry , and no

suspicious microcalcifications left breast.  



IMPRESSION: 

BI-RADS CATEGORY: 0 - INCOMPLETE- Need additional imaging

evaluation.

FOLLOW-UP: Recall for additional imaging: Directed right breast

ultrasound region of interest..



Written communication concerning the IMPRESSION and Follow-up,

will be mailed to the patient and referring health care provider.



Electronically signed by:  Hema Dolan MD  2/14/2020 2:40 PM CST

Workstation: [id number]

## 2020-03-11 ENCOUNTER — HOSPITAL ENCOUNTER (OUTPATIENT)
Dept: HOSPITAL 39 - MAMMO | Age: 75
End: 2020-03-11
Attending: NURSE PRACTITIONER
Payer: MEDICARE

## 2020-03-11 DIAGNOSIS — R92.8: Primary | ICD-10-CM

## 2020-03-11 PROCEDURE — 76641 ULTRASOUND BREAST COMPLETE: CPT

## 2020-03-11 PROCEDURE — 77065 DX MAMMO INCL CAD UNI: CPT

## 2020-03-11 NOTE — MAM
EXAM DESCRIPTION: 

3D Diagnostic, Right (accession [id number]), Breast,Right

(accession [id number]): Ultrasound



CLINICAL HISTORY: 

74 yearsFemaleABN MAMMO mass density right breast. Lifetime risk

of developing breast cancer (Tyrer-Cuzick model)(%):  3.2



COMPARISON: 

Other



TECHNIQUE: 

Right breast LM projection full-field images, digital

tomosynthesis technique. Right breast 2-D digital full-field

images:  LM projection. CAD available for 2-D images..

Transcutaneous scanning of the right breast utilizing gray-scale

and Doppler modes. Scanning performed by the sonographer and Dr. Dolan.



FINDINGS: 

The breast parenchymal density pattern is: Almost entirely fatty.

 No skin thickening or nipple retraction  the mass density in

question is again seen at the 9:00 position of the right breast 7

8 cm from the nipple. 2.5 cm from the lateral breast margin. No

microcalcifications. Compared to the prior study, the long axis

of the mass now measures 7.7 cm compared to 1.2 cm on the

screening study.



Ultrasound: Scanning of the 9:00-11:00 region of the right breast

anterior middle third 7 8 cm from the nipple. Predominantly fatty

tissues. No dominant solid mass, no distinct cyst, no parenchymal

edema, no large calcifications. A calcification measuring

approximately 3 mm is visualized. This coarse calcification is

visible on the screening and 2 days mammogram images.



IMPRESSION: 

Benign exam. Shrinking lymph node or cyst, seen only on

mammography.



BIRAD CATEGORY: 2 BENIGN FINDINGS.



RECOMMENDATIONS:

FOLLOW UP: Return to routine digital bilateral  mammographic

screening, one year interval from  February 2020.



Written communication explaining the IMPRESSION and follow-up,

will be mailed to the patient and referring health care provider.

The FINDINGS  and the FOLLOW-UP plan were reviewed in person with

the patient after the examination. 



According to the American College of [employer], yearly mammograms

are recommended starting at age 40 and continuing as long as a

woman is in good health.  Any breast change noted on a breast

self-exam should be reported promptly to the patient's healthcare

provider.  Breast MRI is recommended for women with an

approximately 20-25% or greater lifetime risk of breast cancer,

including women with a strong family history of breast or ovarian

cancer and women who have been treated for Hodgkin's disease.



A negative mammographic report should not delay tissue diagnosis

in patients with significant clinical history or physical

findings.



Extremely dense breast tissue limits the sensitivity of digital

mammography. 



Electronically signed by:  Hema Dolan MD  3/11/2020 7:18 PM CDT

Workstation: [id number]

## 2020-03-11 NOTE — US
EXAM DESCRIPTION: 

3D Diagnostic, Right (accession [id number]), Breast,Right

(accession [id number]): Ultrasound



CLINICAL HISTORY: 

74 yearsFemaleABN MAMMO mass density right breast. Lifetime risk

of developing breast cancer (Tyrer-Cuzick model)(%):  3.2



COMPARISON: 

Other



TECHNIQUE: 

Right breast LM projection full-field images, digital

tomosynthesis technique. Right breast 2-D digital full-field

images:  LM projection. CAD available for 2-D images..

Transcutaneous scanning of the right breast utilizing gray-scale

and Doppler modes. Scanning performed by the sonographer and Dr. Dolan.



FINDINGS: 

The breast parenchymal density pattern is: Almost entirely fatty.

 No skin thickening or nipple retraction  the mass density in

question is again seen at the 9:00 position of the right breast 7

8 cm from the nipple. 2.5 cm from the lateral breast margin. No

microcalcifications. Compared to the prior study, the long axis

of the mass now measures 7.7 cm compared to 1.2 cm on the

screening study.



Ultrasound: Scanning of the 9:00-11:00 region of the right breast

anterior middle third 7 8 cm from the nipple. Predominantly fatty

tissues. No dominant solid mass, no distinct cyst, no parenchymal

edema, no large calcifications. A calcification measuring

approximately 3 mm is visualized. This coarse calcification is

visible on the screening and 2 days mammogram images.



IMPRESSION: 

Benign exam. Shrinking lymph node or cyst, seen only on

mammography.



BIRAD CATEGORY: 2 BENIGN FINDINGS.



RECOMMENDATIONS:

FOLLOW UP: Return to routine digital bilateral  mammographic

screening, one year interval from  February 2020.



Written communication explaining the IMPRESSION and follow-up,

will be mailed to the patient and referring health care provider.

The FINDINGS  and the FOLLOW-UP plan were reviewed in person with

the patient after the examination. 



According to the American College of Radiology, yearly mammograms

are recommended starting at age 40 and continuing as long as a

woman is in good health.  Any breast change noted on a breast

self-exam should be reported promptly to the patient's healthcare

provider.  Breast MRI is recommended for women with an

approximately 20-25% or greater lifetime risk of breast cancer,

including women with a strong family history of breast or ovarian

cancer and women who have been treated for Hodgkin's disease.



A negative mammographic report should not delay tissue diagnosis

in patients with significant clinical history or physical

findings.



Extremely dense breast tissue limits the sensitivity of digital

mammography. 



Electronically signed by:  Hema Dolan MD  3/11/2020 7:18 PM CDT

Workstation: [id number]

## 2020-06-29 ENCOUNTER — HOSPITAL ENCOUNTER (EMERGENCY)
Dept: HOSPITAL 39 - ER | Age: 75
Discharge: ANOTHER HOSPITAL | End: 2020-06-29
Payer: MEDICARE

## 2020-06-29 VITALS — OXYGEN SATURATION: 94 % | SYSTOLIC BLOOD PRESSURE: 167 MMHG | TEMPERATURE: 98 F | DIASTOLIC BLOOD PRESSURE: 84 MMHG
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
## 2020-06-29 DIAGNOSIS — Z99.2: ICD-10-CM

## 2020-06-29 DIAGNOSIS — T81.31XA: ICD-10-CM

## 2020-06-29 DIAGNOSIS — I50.9: ICD-10-CM

## 2020-06-29 DIAGNOSIS — I13.2: ICD-10-CM

## 2020-06-29 DIAGNOSIS — L03.113: Primary | ICD-10-CM

## 2020-06-29 DIAGNOSIS — E11.22: ICD-10-CM

## 2020-06-29 DIAGNOSIS — N18.6: ICD-10-CM

## 2020-06-29 PROCEDURE — 71045 X-RAY EXAM CHEST 1 VIEW: CPT

## 2020-06-29 PROCEDURE — 87040 BLOOD CULTURE FOR BACTERIA: CPT

## 2020-06-29 PROCEDURE — 93005 ELECTROCARDIOGRAM TRACING: CPT

## 2020-06-29 PROCEDURE — 85025 COMPLETE CBC W/AUTO DIFF WBC: CPT

## 2020-06-29 PROCEDURE — 36415 COLL VENOUS BLD VENIPUNCTURE: CPT

## 2020-06-29 PROCEDURE — 80053 COMPREHEN METABOLIC PANEL: CPT

## 2020-06-29 NOTE — ED.PDOC
History of Present Illness





- General


Chief Complaint: General


Stated Complaint: right arm pain


Time Seen by Provider: 20 12:23


Source: patient, RN notes reviewed, Vital Signs reviewed


Exam Limitations: no limitations





- History of Present Illness


Initial Comments: 





Patient is a 75-year-old white female who is a dialysis patient.  Patient was at

dialysis today and was complaining to the staff and they recommended that she 

come to the ED.  Patient is complaining of right arm numbness which is been this

way since she had surgery to make a fistula in the arm.  This numbness is been 

ongoing for over a month.  Additionally, patient has redness and swelling around

the incision site.  And the incision site has opened up.  Patient is also 

complaining of pain up in her left anterior chest wall with surrounding redness.

 This is the site of her permacath.  The redness is new and started just a few 

days ago.  The pain in her arm is throbbing in nature.  It is moderate in in

tensity.  It does not radiate.  The pain is worse with palpation or movement.  

Nothing makes the pain better.


Timing/Duration: 24 hours, getting worse


Severity: moderate


Improving Factors: nothing


Worsening Factors: movement


Associated Symptoms: denies symptoms


Allergies/Adverse Reactions: 


Allergies





NO KNOWN ALLERGY Allergy (Verified 18 00:04)


   





Home Medications: 


Ambulatory Orders





Clopidogrel Bisulfate [Plavix] 75 mg PO BEDTIME 17 


Insulin Glargine [Lantus Solostar] 40 unit SC DAILY 17 


Lisinopril 20 mg PO DAILY 17 


Metoprolol Tartrate 50 mg PO BID 17 


Pravastatin Sodium 40 mg PO BEDTIME 17 


Furosemide Tab [Lasix Tab] 40 mg PO DAILY  tab 10/27/17 











Review of Systems





- Review of Systems


Constitutional: States: no symptoms reported, see HPI.  Denies: chills, fever, 

malaise, weakness


EENTM: States: no symptoms reported.  Denies: eye pain, double vision


Respiratory: States: no symptoms reported.  Denies: cough, short of breath, 

wheezing


Cardiology: States: no symptoms reported.  Denies: chest pain, palpitations, 

syncope


Gastrointestinal/Abdominal: States: no symptoms reported.  Denies: abdominal 

pain, diarrhea, nausea, vomiting


Genitourinary: States: no symptoms reported


Musculoskeletal: States: see HPI, muscle pain, muscle stiffness.  Denies: back 

pain


Skin: States: change in color - Redness surrounding the fistula site on the 

right upper extremity and on the left anterior chest wall at the site of the 

permacath.


Endocrine: States: no symptoms reported


Hematologic/Lymphatic: States: no symptoms reported


All other Systems: No Change from Baseline





Past Medical History (General)





- Patient Medical History


Hx Seizures: No


Hx Stroke: No


Hx Asthma: No


Hx of COPD: No


Hx Cardiac Disorders: Yes


Hx Congestive Heart Failure: Yes


Hx Pacemaker: No


Hx Hypertension: Yes


Hx Thyroid Disease: No


Hx Diabetes: Yes


Hx Gastroesophageal Reflux: No


Hx Renal Disease: Yes


Hx Cancer: No


Hx of HIV: No


Hx Hepatitis C: No


Hx MRSA: No





- Vaccination History


Hx Tetanus, Diphtheria Vaccination: Yes


Hx Influenza Vaccination: Yes


Hx Pneumococcal Vaccination: Yes





- Social History


Hx Tobacco Use: No


Hx Alcohol Use: No


Hx Substance Use: No


Hx Physical Abuse: No


Hx Emotional Abuse: No


Hx Suspected Abuse: No





- Female History


Patient Pregnant: No





Family Medical History





- Family History


  ** Mother


Family History: No Known


Living Status: 


Hx Cardiac Disease: Yes - multiple family members


Hx Family Diabetes: Yes - multiple family members





Physical Exam





- Physical Exam


General Appearance: Alert, Comfortable, Well Developed, Well Hydrated, Well 

Nourished


Eye Exam: bilateral normal


Ears, Nose, Throat: hearing grossly normal, normal ENT inspection, normal 

pharynx


Neck: non-tender, full range of motion, supple, normal inspection


Respiratory: lungs clear, normal breath sounds, no respiratory distress, other -

Patient with complaints of tenderness to palpation of the left anterior chest 

wall with surrounding redness at the site of the permacath.


Cardiovascular/Chest: normal peripheral pulses, regular rate, rhythm, no edema


Peripheral Pulses: radial,right: 2+, radial,left: 2+


Gastrointestinal/Abdominal: normal bowel sounds, non tender, soft, no 

organomegaly


Back Exam: normal inspection, no CVA tenderness, no vertebral tenderness


Extremity: normal range of motion, swelling - Of the right upper extremity at 

the site of the fistula.  There is dehiscence of the surgical wound at the site 

of the fistula.  There is surrounding redness and tenderness to palpation.  

There is no crepitus or purulent discharge.


Neurologic: CNs II-XII nml as tested, no motor/sensory deficits, alert, normal 

mood/affect, oriented x 3


Skin Exam: warm/dry, other - Redness at the site of the surgical fistula on the 

right upper extremity and redness around the site of the permacath on the left 

anterior chest wall.





Progress





- Progress


Progress: 


Differential diagnosis: Cellulitis, abscess, infected permacath, pneumothorax 

among others.








20 14:19





Patient with a history of renal failure.  It appears that she has got failure of

her fistula with wound dehiscence and may have cellulitis surrounding her 

permacath on the anterior chest wall.  Patient started on vancomycin IV.  Plan 

on transfer to [place] for further evaluation.  I discussed this plan of 

care with the patient she voices understanding and agreement.  Patient is stable

for transfer.





Dean Huerta M.D.


#[phone]














- Results/Orders


Results/Orders: 





EXAM DESCRIPTION:  Chest,1 View  





CLINICAL HISTORY:  left chest wall redness and pain  





FINDINGS/ IMPRESSION:  Comparison 2018  Left internal jugular large bore 

catheter distal tip in the superior vena cava. No pneumothorax  Prior cardiac 

surgery. Heart size within normal limits for this apical lordotic positioning. 

Vascular congestion. Mild perihilar peribronchial thickening which can be seen 

with bronchitis.. No alveolar consolidation or pleural effusion  





Electronically signed by: Guero Hoffman MD 2020 12:38








EKG performed on 2020 at 1233 hrs.: Normal sinus rhythm at 81 bpm, right

axis deviation, ST and T wave abnormalities inferiorly concerning for ischemia, 

prolonged QT at 432 ms, abnormal EKG.  No comparison EKG available at this time.





                                        





20 12:30


BLOOD CULTURE Stat 


EKG .ONCE 





20 13:00


Vancomycin HCl Inj 1,000 mg   Sodium Chloride 0.9% 250Ml [NS 250ml] 250 ml IVPB 

ONCE 








                         Laboratory Results - last 24 hr











  20





  12:30 12:30


 


WBC  9.5 


 


RBC  3.48 L 


 


Hgb  11.4 L 


 


Hct  33.6 L 


 


MCV  96.5 


 


MCH  32.9 H 


 


MCHC  34.1 


 


RDW  15.3 H 


 


Plt Count  180 


 


MPV  9.5 


 


Absolute Neuts (auto)  6.30 


 


Absolute Lymphs (auto)  2.20 


 


Absolute Monos (auto)  0.60 


 


Absolute Eos (auto)  0.30 


 


Absolute Basos (auto)  0.10 


 


Neutrophils %  66.4 


 


Lymphocytes %  23.1 


 


Monocytes %  6.6 


 


Eosinophils %  2.8 


 


Basophils %  1.1 


 


Sodium   133 L


 


Potassium   4.1


 


Chloride   96 L


 


Carbon Dioxide   25


 


Anion Gap   16.1


 


BUN   30 H


 


Creatinine   3.07 H


 


BUN/Creatinine Ratio   9.8 L


 


Random Glucose   336 H


 


Serum Osmolality   285.8


 


Calcium   8.3 L


 


Total Bilirubin   0.5


 


AST   29


 


ALT   29


 


Alkaline Phosphatase   95


 


Serum Total Protein   6.8


 


Albumin   3.1 L


 


Globulin   3.7 H


 


Albumin/Globulin Ratio   0.8 L














                                   Vital Signs











  20





  12:55


 


Temperature 98.8 F


 


Pulse Rate [ 83





brachial] 


 


Respiratory 16





Rate 


 


Blood Pressure 142/66





[Left Arm] 


 


O2 Sat by Pulse 97





Oximetry 














Departure





- Departure


Clinical Impression: 


Cellulitis


Qualifiers:


 Site of cellulitis: extremity Site of cellulitis of extremity: upper extremity 

Laterality: right Qualified Code(s): L03.113 - Cellulitis of right upper limb





Surgical wound dehiscence


Qualifiers:


 Encounter type: initial encounter Qualified Code(s): T81.31XA - Disruption of 

external operation (surgical) wound, not elsewhere classified, initial encounter





Time of Disposition: 15:17


Disposition: Transfer to Hospital


Condition: Good


Departure Forms:  ED Discharge - Pt. Copy, Patient Portal Self Enrollment


Referrals: 


Katia Birmingham NP [Primary Care Provider] - 1-2 Weeks


Home Medications: 


Ambulatory Orders





Clopidogrel Bisulfate [Plavix] 75 mg PO BEDTIME 17 


Insulin Glargine [Lantus Solostar] 40 unit SC DAILY 17 


Lisinopril 20 mg PO DAILY 17 


Metoprolol Tartrate 50 mg PO BID 17 


Pravastatin Sodium 40 mg PO BEDTIME 17 


Furosemide Tab [Lasix Tab] 40 mg PO DAILY  tab 10/27/17 











Transfer to Outside Facility





- Transfer Information


Decision to Transfer Date: 20


Decision to Transfer Time: 14:30


Reason for Transfer: required specialist not available


Accepting Facility: [facility]

## 2020-06-29 NOTE — RAD
EXAM DESCRIPTION: 



Chest,1 View



CLINICAL HISTORY: 



left chest wall redness and pain 



FINDINGS/ IMPRESSION: 



Comparison 12/28/2018



Left internal jugular large bore catheter distal tip in the

superior vena cava. No pneumothorax



Prior cardiac surgery. Heart size within normal limits for this

apical lordotic positioning. Vascular congestion. Mild perihilar

peribronchial thickening which can be seen with bronchitis.. No

alveolar consolidation or pleural effusion



Electronically signed by:  Guero Hoffman MD  6/29/2020 12:38

PM CDT Workstation: [id number]